# Patient Record
(demographics unavailable — no encounter records)

---

## 2024-11-05 NOTE — PROCEDURE
[FreeTextEntry1] : The procedure risks, hazards and alternatives were discussed with the patient and appropriate consent was obtained. The patient's left occipital area was palpated to identify location of greater occipital nerve. Alcohol was applied topically to the skin. Using a 27 gauge needle (aspirating during insertion), 2.5 cc of a mixture of Kenalog mg, 1% lidocaine was injected on the right side (directing needle to center, left and right of painful focus). Pressure with a gauze pad was held briefly upon the site of puncture to minimize bleeding and to further spread anaesthetic subcutaneously.  The patient tolerated procedure well without any apparent difficulties or complications.   The procedure risks, hazards and alternatives were discussed with the patient and appropriate consent was obtained. The area over the myofascial spasm / pain was prepped with alcohol utilizing sterile technique. After isolating it between two palpating fingertips a 27 gauge 1.5 needle was placed in the center of the myofascial spasm and a negative aspiration was performed. A total of 4 mL of 1% Lidocaine mixed with Kenalog 40 mg was injected into 3 sites. The patient tolerated procedure well without any apparent difficulties or complications.  kenalog [id number] , exp 5/2026Lidocaine [id number] 07/2026  ENA SAINTSIMONJEANTY was monitored in the office for 15 minutes after injections and tolerated procedure well without adverse events.

## 2024-11-05 NOTE — PHYSICAL EXAM
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 During the evaluation, additional symptoms were mentioned and discussed, including:   Last night at home noticed vision just seemed blurred, this morning definitely noticed diplopia mainly vertical with some horizontal component.  No previous similar bouts.  No ptosis, eye pain, redness or swelling around eyes, weakness, numbness, speech issues, or oscillopsia.  Mild headache.  Does have dm x 15 years on insulin.   PAST MEDICAL HISTORY, PAST OCULAR HISTORY, FAMILY HISTORY AND SOCIAL HISTORY: I reviewed the technician's histories as outlined above, there are no additions/changes.  REVIEW OF SYSTEMS:  No eye pain, ptosis, field cuts , rash, fever, chills, nausea, vomiting, jaw claudication or blackouts of vision.   Alert and oriented x 3, in no acute distress.  PUPILS:  4.0 mm ou, 1+ ou   no afferent pupillary defect  INTRAOCULAR PRESSURE BY APPLANATION(3:40 PM):  18/17  EXTERNAL EXAM:  normal strength, normal sensation, normal head position; no abnormal head turn/tilt, negative periorbital/lacrimal palpation, no adenopathy, no proptosis and trace brow ptosis, OU  ALIGNMENT:  Right hypertropia worse in right gaze and worse left head tilt  eom full no nystagmus  SLIT LAMP EXAM:       LIDS, LASHES AND LACRIMAL:  OD (right eye): dermatochalasis //OS (left eye): dermatochalasis no ptosis        CONJUNCTIVAE AND SCLERAE:  OD: (right eye) clear  //OS (left eye): clear          CORNEA:  OD (right eye): clear  //OS (left eye):  clear       ANTERIOR CHAMBER:  OD (right eye): deep and quiet  // OS (left eye): deep and quiet       IRIS:  OD (right eye): round and regular without neovascularization  //OS (left eye): round and regular without neovascularization       LENS:  OD (right eye): 1+ cortical cataract  //OS (left eye): 1+ cortical  cataract   NERVES:  Flat healthy ou     ASSESSMENT/PLAN:  1. Acute onset diplopia mainly vertical:  History of dm.  No other focal neuro symptoms.  No ptosis.  Has right hypertropia worse in right gaze and left head tilt so does not map out to fourth nerve palsy.  ? Etiology ? Skew deviation.  I recommend mri brain with special attention to midbrain and see pmd for further workup.  I will order mri    Schedule mri brain   [FreeTextEntry1] : Constitutional: No signs of distress. No signs of toxicity.  Neck/spine: + pain over R greater occipital notch with reproducible pain radiating forwards to temple and behind eye.  Examination of the cervical spine reveals limited range of motion in the neck worse rotation to right,  + R cervical paraspinal muscle tenderness as well as trap tenderness. Examination of the lumbar spine reveals normal range of motion including flexion, extension and lateral rotation. No midline tenderness, no paraspinal muscle tenderness, negative facet loading,  no tenderness of sciatic notch, no tenderness of bilateral greater trochanters, MS: Alert and well oriented. Speech fluent. No aphasia. Fund of knowledge intact.  Psychiatric: Mood stable. Motor: Adequate bulk, tone, strength. 5/5 strength DTR: : 2+ b/l biceps, 2+ triceps, 2 + brachioradialis, 2+ patellar, and 2+ ankle reflexes. Plantar responses were flexor bilaterally, no clonus Sensory: intact to primary and secondary modalities;  Cerebellar and gait: intact Eyes: no redness or swelling Pulmonary: no respiratory distress Vascular: no temperature,color changes; no edema Musculoskeletal:  no joint deformities ,  no scoliosis, lordosis, kyphosis Skin: No rash.

## 2024-11-05 NOTE — ASSESSMENT
[FreeTextEntry1] : 39 year F with progressively worsening headaches that are multifactorial including Migraines, cervicogenic as well as occipital neuralgia. On exam + pain over R greater occipital notch with reproducible pain radiating forwards to temple and behind eye as well  as R trap and cervical paraspinal muscle tenderness with spasm and trigger points.    Extensively discussed the nature of Migraine headaches as well as importance of lifestyle modifications including sleep, diet, exercise, proper hydration, avoidance of caffeine, limiting alcohol intake as well as avoidance of triggers. We also discussed nonpharmacologic treatments including therapeutic massage, breathing exercises, muscle relaxation techniques, acupuncture, acupressure as well as CBT.   -Right ONB as well as TPI performed today without AE -start diclofenac 50 mg 2x/day with meals x 10-14 days then prn consider low dose muscle relaxant q hs but reluctant -Consider D/c Topamax     The patient had the opportunity to ask questions and to provide feedback. All questions were answered accordingly.  The patient verbalized understanding of the management plan.
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
## 2024-12-05 NOTE — PHYSICAL EXAM
[General Appearance - In No Acute Distress] : in no acute distress [Affect] : the affect was normal [Mood] : the mood was normal [Person] : oriented to person [Place] : oriented to place [Time] : oriented to time [Short Term Intact] : short term memory intact [Remote Intact] : remote memory intact [Registration Intact] : recent registration memory intact [Fluency] : fluency intact [Current Events] : adequate knowledge of current events [Cranial Nerves Optic (II)] : visual acuity intact bilaterally,  visual fields full to confrontation, pupils equal round and reactive to light [Cranial Nerves Oculomotor (III)] : extraocular motion intact [Cranial Nerves Trigeminal (V)] : facial sensation intact symmetrically [Cranial Nerves Facial (VII)] : face symmetrical [Cranial Nerves Vestibulocochlear (VIII)] : hearing was intact bilaterally [Cranial Nerves Glossopharyngeal (IX)] : tongue and palate midline [Cranial Nerves Accessory (XI - Cranial And Spinal)] : head turning and shoulder shrug symmetric [Cranial Nerves Hypoglossal (XII)] : there was no tongue deviation with protrusion [Motor Tone] : muscle tone was normal in all four extremities [Motor Strength] : muscle strength was normal in all four extremities [No Muscle Atrophy] : normal bulk in all four extremities [Motor Handedness Right-Handed] : the patient is right hand dominant [Sensation Tactile Decrease] : light touch was intact [Abnormal Walk] : normal gait [Balance] : balance was intact [2+] : Ankle jerk left 2+ [Plantar Reflex Right Only] : normal on the right [Plantar Reflex Left Only] : normal on the left [FreeTextEntry1] : no occipital tenderness, mild right trap msk spsm

## 2024-12-05 NOTE — ASSESSMENT
[FreeTextEntry1] :  chronic migraines. had great  response from ONB and TP. Migraines i less frequent and decreased intensity. she is happy with current regime TPM 75 mg/ qd she does not plan on getting pregnant. will consider taper. nurtec prn   -Neck pain - continue with Pain mgmt       contact me if there are any changes in the quality or severity of the headaches. All questions answered, understanding verbalized. Patient in agreement with plan of care

## 2025-02-10 NOTE — ASSESSMENT
[FreeTextEntry1] : 40 year F with progressively worsening headaches that are multifactorial including Migraines, cervicogenic as well as occipital neuralgia. On exam + pain over R greater occipital notch with reproducible pain radiating forwards to temple and behind eye as well  as R trap and cervical paraspinal muscle tenderness with spasm and trigger points.    Extensively discussed the nature of Migraine headaches as well as importance of lifestyle modifications including sleep, diet, exercise, proper hydration, avoidance of caffeine, limiting alcohol intake as well as avoidance of triggers. We also discussed nonpharmacologic treatments including therapeutic massage, breathing exercises, muscle relaxation techniques, acupuncture, acupressure as well as CBT.   PT -Recommend ONB and TPI .  will schedule asap.     The patient had the opportunity to ask questions and to provide feedback. All questions were answered accordingly.  The patient verbalized understanding of the management plan.

## 2025-02-10 NOTE — PHYSICAL EXAM
[FreeTextEntry1] : Constitutional: No signs of distress. No signs of toxicity.  Neck/spine: + pain over R greater occipital notch with reproducible pain radiating forwards to temple and behind eye.  Examination of the cervical spine reveals limited range of motion in the neck worse rotation to right,  + R cervical paraspinal muscle tenderness as well as trap tenderness. Examination of the lumbar spine reveals normal range of motion including flexion, extension and lateral rotation. No midline tenderness, no paraspinal muscle tenderness, negative facet loading,  no tenderness of sciatic notch, no tenderness of bilateral greater trochanters, MS: Alert and well oriented. Speech fluent. No aphasia. Fund of knowledge intact.  Psychiatric: Mood stable. Motor: Adequate bulk, tone, strength. 5/5 strength DTR: : 2+ b/l biceps, 2+ triceps, 2 + brachioradialis, 2+ patellar, and 2+ ankle reflexes. Plantar responses were flexor bilaterally, no clonus Sensory: intact to primary and secondary modalities;  Cerebellar and gait: intact Eyes: no redness or swelling Pulmonary: no respiratory distress Vascular: no temperature,color changes; no edema Musculoskeletal:  no joint deformities ,  no scoliosis, lordosis, kyphosis Skin: No rash.
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
## 2025-02-12 NOTE — PROCEDURE
[FreeTextEntry1] : The procedure risks, hazards and alternatives were discussed with the patient and appropriate consent was obtained. The patient's left occipital area was palpated to identify location of greater occipital nerve. Alcohol was applied topically to the skin. Using a 27 gauge needle (aspirating during insertion), 2.5 cc of a mixture of Kenalog mg, 1% lidocaine was injected on the LEFT side (directing needle to center, left and right of painful focus). Pressure with a gauze pad was held briefly upon the site of puncture to minimize bleeding and to further spread anaesthetic subcutaneously. The procedure was repeated on the right side, injecting a further 2.5 cc on that side. The patient tolerated procedure well without any apparent difficulties or complications.  The procedure risks, hazards and alternatives were discussed with the patient and appropriate consent was obtained. The area over the myofascial spasm / pain was prepped with alcohol utilizing sterile technique. After isolating it between two palpating fingertips a 27 gauge 1.5 needle was placed in the center of the myofascial spasm and a negative aspiration was performed. A total of 5 mL of 1% Lidocaine mixed with Kenalog 40 mg was injected into 4sites. The patient tolerated procedure well without any apparent difficulties or complications.  Kenalog [id number] 9/2026, Lidocaine [id number] exp 06/30/2026 ENA SAINTSIMONJEANTY was monitored in the office for 10 minutes after injections and tolerated procedure well without adverse events.

## 2025-02-12 NOTE — ASSESSMENT
[FreeTextEntry1] : 40 year F with progressively worsening headaches that are multifactorial including Migraines, cervicogenic as well as occipital neuralgia. On exam + pain over R greater occipital notch with reproducible pain radiating forwards to temple and behind eye as well  as R trap and cervical paraspinal muscle tenderness with spasm and trigger points.    Extensively discussed the nature of Migraine headaches as well as importance of lifestyle modifications including sleep, diet, exercise, proper hydration, avoidance of caffeine, limiting alcohol intake as well as avoidance of triggers. We also discussed nonpharmacologic treatments including therapeutic massage, breathing exercises, muscle relaxation techniques, acupuncture, acupressure as well as CBT.   PT -B/L ONB and TPI R traps and C paraspinals performed today- R sided HA resolved  and blurred vision left resolved following procedure.  - Continue low dose muscle relaxant as needed.  -Diclofenac 50 mg 2x/day prn  -TENS  -Modalities,  therapeutic massage, stretching .   The patient had the opportunity to ask questions and to provide feedback. All questions were answered accordingly.  The patient verbalized understanding of the management plan.
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
## 2025-03-06 NOTE — PHYSICAL EXAM
[Person] : oriented to person [Place] : oriented to place [Time] : oriented to time [Short Term Intact] : short term memory intact [Remote Intact] : remote memory intact [Registration Intact] : recent registration memory intact [Current Events] : adequate knowledge of current events [Cranial Nerves Optic (II)] : visual acuity intact bilaterally,  visual fields full to confrontation, pupils equal round and reactive to light [Cranial Nerves Oculomotor (III)] : extraocular motion intact [Cranial Nerves Trigeminal (V)] : facial sensation intact symmetrically [Cranial Nerves Facial (VII)] : face symmetrical [Cranial Nerves Vestibulocochlear (VIII)] : hearing was intact bilaterally [Cranial Nerves Glossopharyngeal (IX)] : tongue and palate midline [Cranial Nerves Accessory (XI - Cranial And Spinal)] : head turning and shoulder shrug symmetric [Cranial Nerves Hypoglossal (XII)] : there was no tongue deviation with protrusion [Motor Tone] : muscle tone was normal in all four extremities [Motor Strength] : muscle strength was normal in all four extremities [No Muscle Atrophy] : normal bulk in all four extremities [Motor Handedness Right-Handed] : the patient is right hand dominant [Sensation Tactile Decrease] : light touch was intact [Abnormal Walk] : normal gait [Balance] : balance was intact [General Appearance - In No Acute Distress] : in no acute distress [Affect] : the affect was normal [Mood] : the mood was normal [2+] : Patella left 2+ [No Spinal Tenderness] : no spinal tenderness [FreeTextEntry1] : right trap. ttp reduced ROM

## 2025-03-06 NOTE — ASSESSMENT
[FreeTextEntry1] : 40 year old f with  chronic migraines and cervical  neck pain with relief with  ONB and TP now lingering. +pain to palpation to R trap. she will continue to follow with pain management, msk relaxers,TPM 75 mg/ qd Nurtec prn   referral to PT and acupuncture    contact me if there are any changes in the quality or severity of the headaches. All questions answered, understanding verbalized. Patient in agreement with plan of care

## 2025-07-10 NOTE — PHYSICAL EXAM
[Person] : oriented to person [Place] : oriented to place [Time] : oriented to time [Short Term Intact] : short term memory intact [Remote Intact] : remote memory intact [Registration Intact] : recent registration memory intact [Current Events] : adequate knowledge of current events [Cranial Nerves Optic (II)] : visual acuity intact bilaterally,  visual fields full to confrontation, pupils equal round and reactive to light [Cranial Nerves Oculomotor (III)] : extraocular motion intact [Cranial Nerves Trigeminal (V)] : facial sensation intact symmetrically [Cranial Nerves Facial (VII)] : face symmetrical [Cranial Nerves Vestibulocochlear (VIII)] : hearing was intact bilaterally [Cranial Nerves Glossopharyngeal (IX)] : tongue and palate midline [Cranial Nerves Accessory (XI - Cranial And Spinal)] : head turning and shoulder shrug symmetric [Cranial Nerves Hypoglossal (XII)] : there was no tongue deviation with protrusion [Motor Tone] : muscle tone was normal in all four extremities [Motor Strength] : muscle strength was normal in all four extremities [No Muscle Atrophy] : normal bulk in all four extremities [Motor Handedness Right-Handed] : the patient is right hand dominant [Sensation Tactile Decrease] : light touch was intact [Abnormal Walk] : normal gait [Balance] : balance was intact [2+] : Patella left 2+ [General Appearance - In No Acute Distress] : in no acute distress [Affect] : the affect was normal [Mood] : the mood was normal [PERRL With Normal Accommodation] : pupils were equal in size, round, reactive to light, with normal accommodation
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
## 2025-07-10 NOTE — ASSESSMENT
[FreeTextEntry1] : 40 year old f with  chronic migraines and cervical  neck pain with relief with  ONB and TP now lingering. neck pan, but manageable  will continue to follow with pain management TPM 75 mg/ qd Nurtec prn    contact me if there are any changes in the quality or severity of the headaches. All questions answered, understanding verbalized. Patient in agreement with plan of care